# Patient Record
Sex: FEMALE | Race: WHITE | Employment: OTHER | URBAN - METROPOLITAN AREA
[De-identification: names, ages, dates, MRNs, and addresses within clinical notes are randomized per-mention and may not be internally consistent; named-entity substitution may affect disease eponyms.]

---

## 2020-02-24 ENCOUNTER — HOSPITAL ENCOUNTER (OUTPATIENT)
Dept: PHYSICAL THERAPY | Age: 80
Discharge: HOME | End: 2020-02-24
Payer: MEDICARE

## 2020-02-24 PROCEDURE — 97140 MANUAL THERAPY 1/> REGIONS: CPT

## 2020-02-24 PROCEDURE — 97110 THERAPEUTIC EXERCISES: CPT

## 2020-02-24 PROCEDURE — 97161 PT EVAL LOW COMPLEX 20 MIN: CPT

## 2020-02-24 NOTE — PROGRESS NOTES
PHYSICAL THERAPY - DAILY TREATMENT NOTE      Patient Name: Rina Love        Date: 2020  : 1940   YES Patient  Verified  Visit #:      of     Insurance: Payor: [place] MEDICARE / Plan: [place] MEDICARE PART A & B / Product Type: Medicare /      In time: 1:10 Out time: 1:50   Total Treatment Time: 40     Medicare Time/BCBS Tracking (below)   Total Timed Codes (min):  25 1:1 Treatment Time:  25     TREATMENT AREA = Right leg pain [M79.604]     SUBJECTIVE    Pain Level (on 0 to 10 scale):  0  / 10   Medication Changes/New allergies or changes in medical history, any new surgeries or procedures?     NO    If yes, update Summary List   Subjective Functional Status/Changes:  []  No changes reported       See Plan of Care       OBJECTIVE    10 min Therapeutic Exercise:  [x]  See flow sheet   Rationale:      increase ROM and increase strength to improve the patients ability to sit     15 min Manual Therapy: Technique:      [] S/DTM []IASTM []PROM [] Passive Stretching   []manual TPR    []Jt manipulation:Gr I [] II []  III [] IV[] V[]  Treatment Area:  S/L DTM right proximal ITB/lateral hamstring   Rationale:      decrease pain, increase ROM and increase tissue extensibility to improve patient's ability to sit    Billed With/As:   [x] TE   [] TA   [] Neuro   [] Self Care Patient Education: [x] Review HEP    [] Progressed/Changed HEP based on:   [] positioning   [] body mechanics   [] transfers   [] heat/ice application    [] other:      Other Objective/Functional Measures:    See Plan of Care     Post Treatment Pain Level (on 0 to 10) scale:   0  / 10     ASSESSMENT    Assessment/Changes in Function:     See Plan of Care     []  See Progress Note/Recertification   Patient will continue to benefit from skilled PT services to modify and progress therapeutic interventions, address functional mobility deficits, address ROM deficits, address strength deficits, analyze and address soft tissue restrictions, analyze and cue movement patterns, analyze and modify body mechanics/ergonomics and assess and modify postural abnormalities to attain remaining goals. to attain remaining goals.    Progress toward goals / Updated goals:    See Plan of Care     PLAN    [x]  Upgrade activities as tolerated YES Continue plan of care   []  Discharge due to :    []  Other:      Therapist: Trent Saini PT    Date: 2/24/2020 Time: 2:16 PM     Future Appointments   Date Time Provider Cl Robin   2/26/2020  3:30 PM Jerry Rank, PT [facility] AT [facility]   3/2/2020  3:00 PM Jerry Rank, PT [facility] AT [facility]   3/4/2020  3:30 PM Jerry Rank, PT [facility] AT [facility]   3/16/2020 12:00 PM Jerry Rank, PT [facility] AT [facility]   3/18/2020 12:00 PM Jerry Rank, PT [facility] AT [facility]

## 2020-02-24 NOTE — PROGRESS NOTES
[address] [facility] [address] [place], [address] - Phone: [phone]  Fax: [phone] / [address] [facility]  Patient Name: Darian George : 1940   Medical   Diagnosis: Right leg pain [M79.604] Treatment Diagnosis: Right LE Pain   Onset Date: 10/2019     Referral Source: Purvi Vanessa MD Start of Care [facility]): 2020   Prior Hospitalization: See medical history Provider #: [id number]   Prior Level of Function: Hx intermittent L/S Radiculopathy   Comorbidities: Right knee and spine OA, thyroid issues, HTN, hx breast CA bilateral mastectomy   Medications: Verified on Patient Summary List   The Plan of Care and following information is based on the information from the initial evaluation.   ===========================================================================================  Assessment / key information:   Darian George is a 78 y.o.  yo female with Dx of Right leg pain [M79.604]. Patient reported onset of symptoms of insidious onset in October of last year. Patient currently rates pain as 8/10 at worst, 0/10 at best, primarily located at right posterior lateral thingh. Patient complains of difficulty and increase pain with prolonged sitting. Objective Findings:  Gait: slight increase in lateral sway, Manual Muscle Testing: bilateral hip strength 4+/5. Special Test: increased tone and TTP right ITB and lateral hamstring. Neutral pelvic alignment, increased thoracic kyphosis and lumbar scoliotic curve present. Right hip ER, IR and extension ROM slightly limited compared to left. FOTO Score 81 points.   Pt instructed in HEP and will f/u in clinic for PT.  ===================================================================  Eval Complexity: History HIGH Complexity :3+ comorbidities / personal factors will impact the outcome/ POC ;  Examination  HIGH Complexity : 4+ Standardized tests and measures addressing body structure, function, activity limitation and / or participation in recreation ; Presentation LOW Complexity : Stable, uncomplicated ;  Decision Making LOW Complexity : FOTO score of ; Overall Complexity LOW   Problem List: pain affecting function, decrease ROM, decrease strength, impaired gait/ balance, decrease ADL/ functional abilitiies and decrease activity tolerance   Treatment Plan may include any combination of the following: Therapeutic exercise, Therapeutic activities, Neuromuscular re-education, Physical agent/modality, Manual therapy and Patient education  Patient / Family readiness to learn indicated by: asking questions, trying to perform skills and interest  Persons(s) to be included in education: patient (P)  Barriers to Learning/Limitations: None  Measures taken,if barriers to learning:    Patient Goal (s): \"Prevent reoccurrence. \"   Patient self reported health status: excellent  Rehabilitation Potential: good   Short Term Goals: To be accomplished in 2  weeks:  1. Patient will increase FOTO Score to 83 points to improve sitting tolerance. 2. Patient will achieve +2 on GROC to improve sitting tolerance. 3. Patient will report pain 4/10 at worst to improve sitting tolerance.  Long Term Goals: To be accomplished in 4  weeks:  1. Patient will report pain 1/10 at worst to improve sitting tolerance. 2.  Patient will increase FOTO Score to 85 points to improve sitting tolerance. 3.  Patient will achieve +4 on GROC to improve sitting tolerance. 4. Patient will demonstrate independence with HEP to maintain functional gains upon discharge.    Frequency / Duration:   Patient to be seen  2  times per week for 4  weeks:    Patient / Caregiver education and instruction: exercises  Therapist Signature: TALITA Torres Date: 7/05/6254   Certification Period: 2/24/20-5/24/20 Time: 2:20 PM ===========================================================================================  I certify that the above Physical Therapy Services are being furnished while the patient is under my care. I agree with the treatment plan and certify that this therapy is necessary. Physician Signature:        Date:       Time:     Please sign and return to [facility] or you may fax the signed copy to [phone]. Thank you.

## 2020-02-26 ENCOUNTER — HOSPITAL ENCOUNTER (OUTPATIENT)
Dept: PHYSICAL THERAPY | Age: 80
Discharge: HOME | End: 2020-02-26
Payer: MEDICARE

## 2020-02-26 PROCEDURE — 97110 THERAPEUTIC EXERCISES: CPT

## 2020-02-26 PROCEDURE — 97140 MANUAL THERAPY 1/> REGIONS: CPT

## 2020-02-26 NOTE — PROGRESS NOTES
PHYSICAL THERAPY - DAILY TREATMENT NOTE      Patient Name: Sloan Loya        Date: 2020  : 1940   YES Patient  Verified  Visit #:   2   of   8  Insurance: Payor: Anneliese Moses / Plan: VA MEDICARE PART A & B / Product Type: Medicare /      In time: 3:30 Out time: 4:00   Total Treatment Time: 30     Medicare/BCBS Time Tracking (below)   Total Timed Codes (min):  30 1:1 Treatment Time:  30     TREATMENT AREA =  Right leg pain [M79.604]    SUBJECTIVE    Pain Level (on 0 to 10 scale):  0  / 10   Medication Changes/New allergies or changes in medical history, any new surgeries or procedures? NO    If yes, update Summary List   Subjective Functional Status/Changes:  []  No changes reported       Functional improvements: less pain in sitting  Functional impairments: prolonged sitting         OBJECTIVE    20 min Therapeutic Exercise:  [x]  See flow sheet   Rationale:      increase ROM and increase strength to improve the patients ability to sit     10 min Manual Therapy: Technique:      [] S/DTM []IASTM []PROM [] Passive Stretching   []manual TPR    []Jt manipulation:Gr I [] II []  III [] IV[] V[]  Treatment Area:  S/L DTM right lateral hamstring, ITB   Rationale:      decrease pain, increase ROM and increase tissue extensibility to improve patient's ability to sit    Billed With/As:   [x] TE   [] TA   [] Neuro   [] Self Care Patient Education: [x] Review HEP    [] Progressed/Changed HEP based on:   [] positioning   [] body mechanics   [] transfers   [] heat/ice application    [] other:      Other Objective/Functional Measures:    Patient tolerated therex progression without symptom exacerbation.      Post Treatment Pain Level (on 0 to 10) scale:   0  / 10     ASSESSMENT    Assessment/Changes in Function:     Decreased complaints of pain in sitting pain 7/10 at worst.     []  See Progress Note/Recertification   Patient will continue to benefit from skilled PT services to modify and progress therapeutic interventions, address functional mobility deficits, address ROM deficits, address strength deficits, analyze and address soft tissue restrictions, analyze and cue movement patterns, analyze and modify body mechanics/ergonomics and assess and modify postural abnormalities to attain remaining goals. to attain remaining goals.    Progress toward goals / Updated goals:    Fair Progress to    [x] STG    [] LTG  3 as shown by pain 7/10 at worst     PLAN    [x]  Upgrade activities as tolerated YES Continue plan of care   []  Discharge due to :    []  Other:      Therapist: Viji Santana PT    Date: 2/26/2020 Time: 3:44 PM     Future Appointments   Date Time Provider Cl Robin   3/2/2020  3:00 PM Alyssa Bush, PT [facility] AT [facility]   3/4/2020  3:30 PM Alyssa Bush, PT [facility] AT [facility]   3/16/2020 12:00 PM Alyssa Bush, PT [facility] AT [facility]   3/18/2020 12:00 PM Alyssa Bush, PT RE[facility]ENTER AT [facility]

## 2020-03-02 ENCOUNTER — HOSPITAL ENCOUNTER (OUTPATIENT)
Dept: PHYSICAL THERAPY | Age: 80
Discharge: HOME | End: 2020-03-02
Payer: MEDICARE

## 2020-03-02 PROCEDURE — 97140 MANUAL THERAPY 1/> REGIONS: CPT

## 2020-03-02 PROCEDURE — 97110 THERAPEUTIC EXERCISES: CPT

## 2020-03-02 NOTE — PROGRESS NOTES
PHYSICAL THERAPY - DAILY TREATMENT NOTE      Patient Name: Robert Gooden        Date: 3/2/2020  : 1940   YES Patient  Verified  Visit #:   3   of   8  Insurance: Payor: Liana Ashley / Plan: VA MEDICARE PART A & B / Product Type: Medicare /      In time: 3:00 Out time: 3:30   Total Treatment Time: 30     Medicare/BCBS Time Tracking (below)   Total Timed Codes (min):  n/a 1:1 Treatment Time:  n/a     TREATMENT AREA =  Right leg pain [M79.604]    SUBJECTIVE    Pain Level (on 0 to 10 scale):  0  / 10   Medication Changes/New allergies or changes in medical history, any new surgeries or procedures? NO    If yes, update Summary List   Subjective Functional Status/Changes:  []  No changes reported       Functional improvements: none reported  Functional impairments: sitting         OBJECTIVE    20 min Therapeutic Exercise:  [x]  See flow sheet   Rationale:      increase ROM and increase strength to improve the patients ability to sit     10 min Manual Therapy: Technique:      [] S/DTM []IASTM []PROM [] Passive Stretching   []manual TPR    []Jt manipulation:Gr I [] II []  III [] IV[] V[]  Treatment Area: S/L DTM right lumbar, lateral hip, ITB    Rationale:      decrease pain, increase ROM and increase tissue extensibility to improve patient's ability to sit    Billed With/As:   [x] TE   [] TA   [] Neuro   [] Self Care Patient Education: [x] Review HEP    [] Progressed/Changed HEP based on:   [] positioning   [] body mechanics   [] transfers   [] heat/ice application    [] other:      Other Objective/Functional Measures: Addition of lumbar flexion based therex to assess response to right post thigh in sitting. Post Treatment Pain Level (on 0 to 10) scale:   0  / 10     ASSESSMENT    Assessment/Changes in Function:     Patient continues to report right post thigh pain in sitting.      []  See Progress Note/Recertification   Patient will continue to benefit from skilled PT services to modify and progress therapeutic interventions, address functional mobility deficits, address ROM deficits, address strength deficits, analyze and address soft tissue restrictions, analyze and cue movement patterns, analyze and modify body mechanics/ergonomics and assess and modify postural abnormalities to attain remaining goals. to attain remaining goals.    Progress toward goals / Updated goals:    Fair Progress to    [x] STG    [] LTG  3 as shown by pain 7/10 at worst.     PLAN    [x]  Upgrade activities as tolerated YES Continue plan of care   []  Discharge due to :    []  Other:      Therapist: Jhonathan Mauricio PT    Date: 3/2/2020 Time: 3:26 PM     Future Appointments   Date Time Provider Cl Robin   3/4/2020  3:30 PM Leann De La Torre PT [facility] AT [facility]   3/16/2020 12:00 PM Leann De La Torre, PT [facility] AT [facility]   3/18/2020 12:00 PM Leann De La Torre PT RE[facility] AT [facility]

## 2020-03-04 ENCOUNTER — HOSPITAL ENCOUNTER (OUTPATIENT)
Dept: PHYSICAL THERAPY | Age: 80
Discharge: HOME | End: 2020-03-04
Payer: MEDICARE

## 2020-03-04 PROCEDURE — 97110 THERAPEUTIC EXERCISES: CPT

## 2020-03-04 PROCEDURE — 97140 MANUAL THERAPY 1/> REGIONS: CPT

## 2020-03-11 ENCOUNTER — HOSPITAL ENCOUNTER (OUTPATIENT)
Dept: PHYSICAL THERAPY | Age: 80
Discharge: HOME | End: 2020-03-11
Payer: MEDICARE

## 2020-03-11 PROCEDURE — 97110 THERAPEUTIC EXERCISES: CPT

## 2020-03-11 PROCEDURE — 97140 MANUAL THERAPY 1/> REGIONS: CPT

## 2020-03-11 NOTE — PROGRESS NOTES
PHYSICAL THERAPY - DAILY TREATMENT NOTE      Patient Name: Chayo Ruiz        Date: 3/11/2020  : 1940   YES Patient  Verified  Visit #:   5   of   8  Insurance: Payor: Justyna Merlos / Plan: VA MEDICARE PART A & B / Product Type: Medicare /      In time: 3:00 Out time: 3:50   Total Treatment Time: 50     Medicare/BCBS Time Tracking (below)   Total Timed Codes (min):  40 1:1 Treatment Time:  40     TREATMENT AREA =  Right leg pain [M79.604]    SUBJECTIVE    Pain Level (on 0 to 10 scale):  0  / 10   Medication Changes/New allergies or changes in medical history, any new surgeries or procedures?     NO    If yes, update Summary List   Subjective Functional Status/Changes:  []  No changes reported       Functional improvements: sitting  Functional impairments: gardening         OBJECTIVE  Modalities Rationale:     decrease pain to improve patient's ability to garden      min [] Estim, type/location:                                      []  att     []  unatt     []  w/US     []  w/ice    []  w/heat    min []  Mechanical Traction: type/lbs                   []  pro   []  sup   []  int   []  cont    []  before manual    []  after manual    min []  Ultrasound, settings/location:      min []  Iontophoresis w/ dexamethasone, location:                                               []  take home patch       []  in clinic   10 min []  Ice     [x]  Heat    location/position: Supine lumbar    min []  Vasopneumatic Device, press/temp:     min []  Other:    [x] Skin assessment post-treatment (if applicable):    []  intact    [x]  redness- no adverse reaction     []redness  adverse reaction:      30 min Therapeutic Exercise:  [x]  See flow sheet   Rationale:      increase ROM and increase strength to improve the patients ability to garden     10 min Manual Therapy: Technique:      [] S/DTM []IASTM []PROM [] Passive Stretching   []manual TPR    []Jt manipulation:Gr I [] II []  III [] IV[] V[]  Treatment Area:  S/L DTM right lumbar, lateral thigh   Rationale:      decrease pain, increase ROM and increase tissue extensibility to improve patient's ability to sit    Billed With/As:   [x] TE   [] TA   [] Neuro   [] Self Care Patient Education: [x] Review HEP    [] Progressed/Changed HEP based on:   [] positioning   [] body mechanics   [] transfers   [] heat/ice application    [] other:      Other Objective/Functional Measures:    Patient reporting no complaints of radicular symptoms. Post Treatment Pain Level (on 0 to 10) scale:   0  / 10     ASSESSMENT    Assessment/Changes in Function:     Patient reporting improved sitting tolerance and no complaints      []  See Progress Note/Recertification   Patient will continue to benefit from skilled PT services to modify and progress therapeutic interventions, address functional mobility deficits, address ROM deficits, address strength deficits, analyze and address soft tissue restrictions, analyze and cue movement patterns, analyze and modify body mechanics/ergonomics and assess and modify postural abnormalities to attain remaining goals. to attain remaining goals. Progress toward goals / Updated goals:    Good Progress to    [x] STG    [] LTG  3 as shown by pain free sitting.      PLAN    [x]  Upgrade activities as tolerated YES Continue plan of care   []  Discharge due to :    []  Other:      Therapist: Trent Saini PT    Date: 3/11/2020 Time: 3:18 PM     Future Appointments   Date Time Provider Cl Robin   3/16/2020 12:00 PM Jerry Owens, PT [facility] AT [facility]   3/18/2020 12:00 PM Jerry Owens, PT RE[facility] AT [facility]

## 2020-03-16 ENCOUNTER — APPOINTMENT (OUTPATIENT)
Dept: PHYSICAL THERAPY | Age: 80
End: 2020-03-16
Payer: MEDICARE

## 2020-03-18 ENCOUNTER — APPOINTMENT (OUTPATIENT)
Dept: PHYSICAL THERAPY | Age: 80
End: 2020-03-18
Payer: MEDICARE

## 2020-11-04 NOTE — PROGRESS NOTES
[facility] PHYSICAL THERAPY AT [address] [facility], [address] [place] [address], [address], [address] [place] [address]  Phone: [phone]  Fax: [phone] SUMMARY  Patient Name: Bella Gutierrez : 1940       Referral Source: Monica Sexton MD     Date of Initial Visit: 20 Attended Visits: 5 Missed Visits: 0     SUMMARY OF TREATMENT  Treatment focused on manual therapy and therex to improve right posterior thigh pain. CURRENT STATUS  Patient attended 5 visits with good progress toward goals. However, due to change in policy mid-March as a result of COVID protocols, patient was placed on hold and did not return to PT. Unable to assess status at discharge. Discharge at this time. RECOMMENDATIONS  Other: Discharge due to [address]  If you have any questions/comments please contact us directly at [phone]. Thank you for allowing us to assist in the care of your patient.     Therapist Signature: Yinka Turk, JOSÉ Date: 20     Time: 3:24 PM